# Patient Record
Sex: FEMALE | Race: BLACK OR AFRICAN AMERICAN | URBAN - METROPOLITAN AREA
[De-identification: names, ages, dates, MRNs, and addresses within clinical notes are randomized per-mention and may not be internally consistent; named-entity substitution may affect disease eponyms.]

---

## 2023-06-13 ENCOUNTER — APPOINTMENT (OUTPATIENT)
Dept: URBAN - METROPOLITAN AREA CLINIC 305 | Age: 58
Setting detail: DERMATOLOGY
End: 2023-06-13

## 2023-06-13 DIAGNOSIS — L68.0 HIRSUTISM: ICD-10-CM

## 2023-06-13 DIAGNOSIS — L81.0 POSTINFLAMMATORY HYPERPIGMENTATION: ICD-10-CM

## 2023-06-13 PROCEDURE — OTHER PRESCRIPTION: OTHER

## 2023-06-13 PROCEDURE — OTHER COUNSELING: OTHER

## 2023-06-13 PROCEDURE — 99203 OFFICE O/P NEW LOW 30 MIN: CPT

## 2023-06-13 PROCEDURE — OTHER MIPS QUALITY: OTHER

## 2023-06-13 PROCEDURE — OTHER PRESCRIPTION MEDICATION MANAGEMENT: OTHER

## 2023-06-13 RX ORDER — EFLORNITHINE HYDROCHLORIDE 139 MG/G
CREAM TOPICAL BID
Qty: 45 | Refills: 3 | Status: ERX | COMMUNITY
Start: 2023-06-13

## 2023-06-13 ASSESSMENT — LOCATION SIMPLE DESCRIPTION DERM
LOCATION SIMPLE: RIGHT LIP
LOCATION SIMPLE: LEFT CHEEK
LOCATION SIMPLE: CHIN

## 2023-06-13 ASSESSMENT — LOCATION DETAILED DESCRIPTION DERM
LOCATION DETAILED: LEFT CHIN
LOCATION DETAILED: LEFT INFERIOR MEDIAL BUCCAL CHEEK
LOCATION DETAILED: RIGHT LOWER CUTANEOUS LIP

## 2023-06-13 ASSESSMENT — LOCATION ZONE DERM
LOCATION ZONE: FACE
LOCATION ZONE: LIP

## 2023-06-13 NOTE — PROCEDURE: PRESCRIPTION MEDICATION MANAGEMENT
Detail Level: Zone
Plan: Patient has tried and failed Spironolactone, LHR, and vanicream. \\nPatient advised that inquiring with OB/ PCP to look into possible PCOS as main contributing factor. \\nPatient also advised patient to follow up with mental health therapist who prescribed lexapro and bupropion to discuss how antidepressants can stimulate hair growth.
Render In Strict Bullet Format?: No

## 2023-06-13 NOTE — HPI: UNWANTED HAIR
How Did Your Unwanted Hair Appear?: gradual in onset
How Severe Is Your Unwanted Hair?: mild
Additional History: Has done 10 sessions of laser hair removal 4-5 years ago.

## 2023-06-15 ENCOUNTER — APPOINTMENT (OUTPATIENT)
Dept: URBAN - METROPOLITAN AREA CLINIC 305 | Age: 58
Setting detail: DERMATOLOGY
End: 2023-06-15

## 2023-06-15 DIAGNOSIS — Z41.9 ENCOUNTER FOR PROCEDURE FOR PURPOSES OTHER THAN REMEDYING HEALTH STATE, UNSPECIFIED: ICD-10-CM

## 2023-06-15 PROCEDURE — OTHER LASER HAIR REMOVAL: OTHER

## 2023-06-15 ASSESSMENT — LOCATION ZONE DERM
LOCATION ZONE: LIP
LOCATION ZONE: FACE

## 2023-06-15 ASSESSMENT — LOCATION SIMPLE DESCRIPTION DERM
LOCATION SIMPLE: RIGHT CHEEK
LOCATION SIMPLE: LEFT LIP
LOCATION SIMPLE: CHIN
LOCATION SIMPLE: LEFT CHEEK

## 2023-06-15 ASSESSMENT — LOCATION DETAILED DESCRIPTION DERM
LOCATION DETAILED: RIGHT MENTAL CREASE
LOCATION DETAILED: RIGHT INFERIOR CENTRAL MALAR CHEEK
LOCATION DETAILED: LEFT INFERIOR CENTRAL MALAR CHEEK
LOCATION DETAILED: LEFT UPPER CUTANEOUS LIP

## 2023-06-15 NOTE — PROCEDURE: LASER HAIR REMOVAL
Treatment Number: 0
Fluence (Will Not Render If 0): 35
Treatment Number: 1
Anesthesia Type: 1% lidocaine with epinephrine
Pulse Duration (Include Units): 28 ms
Render Post-Care In The Note: No
Cooling: DCD 40/30
Fluence (Will Not Render If 0): 20
Number Of Prepaid Treatments (Will Not Render If 0): 7
Post-Care Instructions: I reviewed with the patient in detail post-care instructions. Patient should avoid sun for a minimum of 4 weeks before and after treatment.
Eye Shield Text: Given the treatment area eye shields were inserted prior to treatment.
Price (Use Numbers Only, No Special Characters Or $): 1500
Cooling: DCD setting
Spot Size: 12 mm
Pre-Procedure: Prior to proceeding the treatment areas were cleaned and all present put on their eye protection.
Post-Procedure Care: Immediate endpoint: perifollicular erythema and edema. Vaseline and ice applied. Post care reviewed with patient.
Shaving (Optional): The patient shaved at home
Spot Size: 9 mm
Fluence (Will Not Render If 0): 28
Laser Type: Alexandrite 755nm
Topical Anesthesia Type: 10% lidocaine
Consent: Written consent obtained, risks reviewed including but not limited to crusting, scabbing, blistering, scarring, darker or lighter pigmentary change, paradoxical hair regrowth, incomplete removal of hair and infection.
Detail Level: Detailed
Pulse Duration (Include Units): 3 ms

## 2023-06-23 ENCOUNTER — APPOINTMENT (OUTPATIENT)
Dept: URBAN - METROPOLITAN AREA CLINIC 305 | Age: 58
Setting detail: DERMATOLOGY
End: 2023-07-04

## 2023-06-23 NOTE — HPI: COSMETIC CONSULTATION
Additional History: Pt came in for cool sculpting consultation. Recommended 10 treatments to cover 100% of abdomen pt stated she just wanted to do 8 to get buy 4 get 4 explained to pt that going to 8 treatments would not give her 100% coverage and pt was okay with that.

## 2023-07-20 ENCOUNTER — APPOINTMENT (OUTPATIENT)
Dept: URBAN - METROPOLITAN AREA CLINIC 305 | Age: 58
Setting detail: DERMATOLOGY
End: 2023-07-24

## 2023-07-20 DIAGNOSIS — Z41.9 ENCOUNTER FOR PROCEDURE FOR PURPOSES OTHER THAN REMEDYING HEALTH STATE, UNSPECIFIED: ICD-10-CM

## 2023-07-20 PROCEDURE — OTHER COOLSCULPTING: OTHER

## 2023-07-20 NOTE — HPI: COSMETIC (COOLSCULPTING)
Have You Had Previous Coolsculpting Treatments Before?: has not had previous treatments
Additional History: Pt tolerated perfectly. Explained to pt she will feel bloated, swollen and may experience bruising and/or numbness

## 2023-07-20 NOTE — PROCEDURE: COOLSCULPTING
Price (Use Numbers Only, No Special Characters Or $): 0510 Price (Use Numbers Only, No Special Characters Or $): 6625

## 2023-07-27 ENCOUNTER — APPOINTMENT (OUTPATIENT)
Dept: URBAN - METROPOLITAN AREA CLINIC 305 | Age: 58
Setting detail: DERMATOLOGY
End: 2023-08-02

## 2023-07-27 DIAGNOSIS — Z41.9 ENCOUNTER FOR PROCEDURE FOR PURPOSES OTHER THAN REMEDYING HEALTH STATE, UNSPECIFIED: ICD-10-CM

## 2023-07-27 PROCEDURE — OTHER LASER HAIR REMOVAL: OTHER

## 2023-07-27 NOTE — HPI: COSMETIC (LASER HAIR REMOVAL)
Have You Had Laser Hair Removal Before?: has had previous treatment previous_has_had_previous_treatment

## 2023-07-27 NOTE — PROCEDURE: LASER HAIR REMOVAL
Pre-Procedure: Prior to proceeding the treatment areas were cleaned and all present put on their eye protection.
Anesthesia Type: 1% lidocaine with epinephrine
Treatment Number: 0
Cooling: DCD setting
Shaving (Optional): The patient shaved at home
Fluence (Will Not Render If 0): 20
Treatment Number: 2
Number Of Prepaid Treatments (Will Not Render If 0): 7
Eye Shield Text: Given the treatment area eye shields were inserted prior to treatment.
Pulse Duration (Include Units): 28 ms
Render Post-Care In The Note: No
Spot Size: 12 mm
Spot Size: 9 mm
Post-Procedure Care: Immediate endpoint: perifollicular erythema and edema. Vaseline and ice applied. Post care reviewed with patient.
Fluence (Will Not Render If 0): 40
Consent: Written consent obtained, risks reviewed including but not limited to crusting, scabbing, blistering, scarring, darker or lighter pigmentary change, paradoxical hair regrowth, incomplete removal of hair and infection.
Pulse Duration (Include Units): 35 ms
Laser Type: Nd:Yag 1064nm
Topical Anesthesia Type: 10% lidocaine
Detail Level: Detailed
Fluence (Will Not Render If 0): 22
Post-Care Instructions: I reviewed with the patient in detail post-care instructions. Patient should avoid sun for a minimum of 4 weeks before and after treatment.
Fluence (Will Not Render If 0): 30

## 2023-08-22 ENCOUNTER — APPOINTMENT (OUTPATIENT)
Dept: URBAN - METROPOLITAN AREA CLINIC 305 | Age: 58
Setting detail: DERMATOLOGY
End: 2023-08-23

## 2023-08-22 DIAGNOSIS — Z41.9 ENCOUNTER FOR PROCEDURE FOR PURPOSES OTHER THAN REMEDYING HEALTH STATE, UNSPECIFIED: ICD-10-CM

## 2023-08-22 PROCEDURE — OTHER COOLSCULPTING: OTHER

## 2023-08-22 NOTE — PROCEDURE: COOLSCULPTING
Price (Use Numbers Only, No Special Characters Or $): 8362 Price (Use Numbers Only, No Special Characters Or $): 3170

## 2023-08-22 NOTE — HPI: COSMETIC (COOLSCULPTING)
previous_has_had_previous_treatments Have You Had Previous Coolsculpting Treatments Before?: has had previous treatments

## 2023-09-07 ENCOUNTER — APPOINTMENT (OUTPATIENT)
Dept: URBAN - METROPOLITAN AREA CLINIC 305 | Age: 58
Setting detail: DERMATOLOGY
End: 2023-09-07

## 2023-09-07 DIAGNOSIS — Z41.9 ENCOUNTER FOR PROCEDURE FOR PURPOSES OTHER THAN REMEDYING HEALTH STATE, UNSPECIFIED: ICD-10-CM

## 2023-09-07 PROCEDURE — OTHER LASER HAIR REMOVAL: OTHER

## 2023-09-07 NOTE — PROCEDURE: LASER HAIR REMOVAL
Number Of Prepaid Treatments (Will Not Render If 0): 0
Were Eye Shields Employed?: No
Fluence (Will Not Render If 0): 35
Fluence (Will Not Render If 0): 20
Cooling: DCD 40/30
Detail Level: Detailed
Spot Size: 18 mm
Pulse Duration (Include Units): 35 ms
Topical Anesthesia Type: 10% lidocaine
Fluence (Will Not Render If 0): 28
Pre-Procedure: Prior to proceeding the treatment areas were cleaned and all present put on their eye protection.
Treatment Number: 3
Spot Size: 9 mm
Anesthesia Type: 1% lidocaine with epinephrine
Eye Shield Text: Given the treatment area eye shields were inserted prior to treatment.
Number Of Prepaid Treatments (Will Not Render If 0): 7
Consent: Written consent obtained, risks reviewed including but not limited to crusting, scabbing, blistering, scarring, darker or lighter pigmentary change, paradoxical hair regrowth, incomplete removal of hair and infection.
Pulse Duration (Include Units): 3 ms
Post-Procedure Care: Immediate endpoint: perifollicular erythema and edema. Vaseline and ice applied. Post care reviewed with patient.
Fluence (Will Not Render If 0): 18
Shaving (Optional): The patient shaved at home
Laser Type: Nd:Yag 1064nm
Cooling: DCD setting
Spot Size: 12 mm
Post-Care Instructions: I reviewed with the patient in detail post-care instructions. Patient should avoid sun for a minimum of 4 weeks before and after treatment.

## 2023-09-13 ENCOUNTER — APPOINTMENT (OUTPATIENT)
Dept: URBAN - METROPOLITAN AREA CLINIC 305 | Age: 58
Setting detail: DERMATOLOGY
End: 2023-09-13

## 2023-09-13 DIAGNOSIS — L68.0 HIRSUTISM: ICD-10-CM

## 2023-09-13 DIAGNOSIS — L90.5 SCAR CONDITIONS AND FIBROSIS OF SKIN: ICD-10-CM

## 2023-09-13 PROCEDURE — 99213 OFFICE O/P EST LOW 20 MIN: CPT

## 2023-09-13 PROCEDURE — OTHER TREATMENT REGIMEN: OTHER

## 2023-09-13 PROCEDURE — OTHER MIPS QUALITY: OTHER

## 2023-09-13 PROCEDURE — OTHER COUNSELING: OTHER

## 2023-09-13 PROCEDURE — OTHER PRESCRIPTION MEDICATION MANAGEMENT: OTHER

## 2023-09-13 NOTE — PROCEDURE: TREATMENT REGIMEN
Plan: Pt has been using silagen and has been pleased with the outcome. Pt advised to discuss potential microneedling with .
Detail Level: Zone

## 2023-09-13 NOTE — PROCEDURE: PRESCRIPTION MEDICATION MANAGEMENT
Detail Level: Zone
Plan: Patient advised that inquiring with OB/ PCP to look into possible PCOS as main contributing factor, will await those results. Patient is satisfied with LHR treatments.
Render In Strict Bullet Format?: No

## 2023-10-11 ENCOUNTER — APPOINTMENT (OUTPATIENT)
Dept: URBAN - METROPOLITAN AREA CLINIC 305 | Age: 58
Setting detail: DERMATOLOGY
End: 2023-10-26

## 2023-10-11 DIAGNOSIS — Z41.9 ENCOUNTER FOR PROCEDURE FOR PURPOSES OTHER THAN REMEDYING HEALTH STATE, UNSPECIFIED: ICD-10-CM

## 2023-10-11 PROCEDURE — OTHER COOLSCULPTING: OTHER

## 2023-10-11 NOTE — PROCEDURE: COOLSCULPTING
Time (Minutes - Will Only Render If Nonzero): 45
Time (Minutes - Will Only Render If Nonzero): 0
Post Treatment: After treatment, the suction was turned off, and the applicator was removed from the skin.
Suction Settings: The suction settings were per protocol.
Applicator Size: CoolAdvantage Plus
Applicator Size: standard applicator
Location 4: lower abdomen (left)
Device: Coolsculpting
Number Of Cycles: 1
Time (Minutes - Will Only Render If Nonzero): 35
Applicator Size: CoolAdvantage Core
Location 5: mid abdomen (left)
Consent: Written consent obtained, risks reviewed including, but not limited to, blistering from suction, darker or lighter pigmentary change, bruising, and/or need for multiple treatments.
Detail Level: Zone
Location 6: mid abdomen (right)
Location 1: lower abdomen (right)
Intro: Prior to treatment, the area was cleaned with alcohol and marked out with a marking pen. The gel sheet was then applied uniformly. The applicator was applied to the skin with good contact and suction.

## 2023-10-25 ENCOUNTER — APPOINTMENT (OUTPATIENT)
Dept: URBAN - METROPOLITAN AREA CLINIC 305 | Age: 58
Setting detail: DERMATOLOGY
End: 2023-10-26

## 2023-10-25 DIAGNOSIS — Z41.9 ENCOUNTER FOR PROCEDURE FOR PURPOSES OTHER THAN REMEDYING HEALTH STATE, UNSPECIFIED: ICD-10-CM

## 2023-10-25 PROCEDURE — OTHER MICRONEEDLING: OTHER

## 2023-10-25 PROCEDURE — OTHER LASER HAIR REMOVAL: OTHER

## 2023-10-25 ASSESSMENT — LOCATION DETAILED DESCRIPTION DERM: LOCATION DETAILED: RIGHT LOWER CUTANEOUS LIP

## 2023-10-25 ASSESSMENT — LOCATION SIMPLE DESCRIPTION DERM: LOCATION SIMPLE: RIGHT LIP

## 2023-10-25 ASSESSMENT — LOCATION ZONE DERM: LOCATION ZONE: LIP

## 2023-10-25 NOTE — PROCEDURE: MICRONEEDLING
Price (Use Numbers Only, No Special Characters Or $): 100
Depth In Mm: 0.1
Post-Care Instructions: After the procedure, take precautions agains sun exposure. Do not apply sunscreen for 12 hours after the procedure. Do not apply make-up for 12 hours after the procedure. Avoid alcohol based toners for 10-14 days. After 2-3 days patients can return to their regular skin regimen.
Treatment Number (Optional): 0
Detail Level: Zone
Topical Anesthesia?: 10% lidocaine
Consent: Written consent obtained, risks reviewed including but not limited to pain, scarring, infection and incomplete improvement.  Patient understands the procedure is cosmetic in nature and will require out of pocket payment.

## 2023-10-25 NOTE — PROCEDURE: LASER HAIR REMOVAL
Fluence (Will Not Render If 0): 20
Treatment Number: 0
Render Post-Care In The Note: No
Fluence (Will Not Render If 0): 35
Spot Size: 18 mm
Cooling: DCD 40/30
Detail Level: Detailed
Fluence (Will Not Render If 0): 16
Laser Type: Alexandrite 755nm
Pulse Duration (Include Units): 3 ms
Consent: Written consent obtained, risks reviewed including but not limited to crusting, scabbing, blistering, scarring, darker or lighter pigmentary change, paradoxical hair regrowth, incomplete removal of hair and infection.
Spot Size: 9 mm
Number Of Prepaid Treatments (Will Not Render If 0): 7
Post-Procedure Care: Immediate endpoint: perifollicular erythema and edema. Vaseline and ice applied. Post care reviewed with patient.
Topical Anesthesia Type: 10% lidocaine
Cooling: DCD setting
Post-Care Instructions: I reviewed with the patient in detail post-care instructions. Patient should avoid sun for a minimum of 4 weeks before and after treatment.
Pulse Duration (Include Units): 35 ms
Treatment Number: 4
Pre-Procedure: Prior to proceeding the treatment areas were cleaned and all present put on their eye protection.
Anesthesia Type: 1% lidocaine with epinephrine
Eye Shield Text: Given the treatment area eye shields were inserted prior to treatment.
Shaving (Optional): The patient shaved at home

## 2023-11-10 ENCOUNTER — APPOINTMENT (OUTPATIENT)
Dept: URBAN - METROPOLITAN AREA CLINIC 305 | Age: 58
Setting detail: DERMATOLOGY
End: 2024-02-20

## 2023-11-10 DIAGNOSIS — Z41.9 ENCOUNTER FOR PROCEDURE FOR PURPOSES OTHER THAN REMEDYING HEALTH STATE, UNSPECIFIED: ICD-10-CM

## 2023-11-10 PROCEDURE — OTHER COOLSCULPTING: OTHER

## 2023-11-10 NOTE — PROCEDURE: COOLSCULPTING
Suction Settings: The suction settings were per protocol.
Time (Minutes - Will Only Render If Nonzero): 0
Applicator Size: CoolAdvantage Core
Consent: Written consent obtained, risks reviewed including, but not limited to, blistering from suction, darker or lighter pigmentary change, bruising, and/or need for multiple treatments.
Applicator Size: standard applicator
Number Of Cycles: 1
Time (Minutes - Will Only Render If Nonzero): 35
Location 1: lower abdomen (right)
Location 2: lower abdomen (left)
Applicator Size: CoolAdvantage Plus
Device: Coolsculpting
Intro: Prior to treatment, the area was cleaned with alcohol and marked out with a marking pen. The gel sheet was then applied uniformly. The applicator was applied to the skin with good contact and suction.
Post Treatment: After treatment, the suction was turned off, and the applicator was removed from the skin.
Time (Minutes - Will Only Render If Nonzero): 45
Detail Level: Zone
10

## 2023-12-07 ENCOUNTER — APPOINTMENT (OUTPATIENT)
Dept: URBAN - METROPOLITAN AREA CLINIC 305 | Age: 58
Setting detail: DERMATOLOGY
End: 2023-12-07

## 2023-12-07 DIAGNOSIS — Z41.9 ENCOUNTER FOR PROCEDURE FOR PURPOSES OTHER THAN REMEDYING HEALTH STATE, UNSPECIFIED: ICD-10-CM

## 2023-12-07 PROCEDURE — OTHER MICRONEEDLING: OTHER

## 2023-12-07 PROCEDURE — OTHER COOLSCULPTING: OTHER

## 2023-12-07 PROCEDURE — OTHER LASER HAIR REMOVAL: OTHER

## 2023-12-07 NOTE — PROCEDURE: COOLSCULPTING
Suction Settings: The suction settings were per protocol.
Number Of Cycles: 1
Consent: Written consent obtained, risks reviewed including, but not limited to, blistering from suction, darker or lighter pigmentary change, bruising, and/or need for multiple treatments.
Time (Minutes - Will Only Render If Nonzero): 0
Applicator Size: standard applicator
Applicator Size: CoolAdvantage Plus
Location 1: lower abdomen (left)
Time (Minutes - Will Only Render If Nonzero): 45
Applicator Size: CoolAdvantage Core
Time (Minutes - Will Only Render If Nonzero): 35
Intro: Prior to treatment, the area was cleaned with alcohol and marked out with a marking pen. The gel sheet was then applied uniformly. The applicator was applied to the skin with good contact and suction.
Device: Coolsculpting
Location 2: lower abdomen (right)
Post Treatment: After treatment, the suction was turned off, and the applicator was removed from the skin.
Detail Level: Zone

## 2023-12-07 NOTE — PROCEDURE: MICRONEEDLING
Topical Anesthesia?: 10% lidocaine
Detail Level: Zone
Treatment Number (Optional): 2
Post-Care Instructions: After the procedure, take precautions agains sun exposure. Do not apply sunscreen for 12 hours after the procedure. Do not apply make-up for 12 hours after the procedure. Avoid alcohol based toners for 10-14 days. After 2-3 days patients can return to their regular skin regimen.
# Of Treatments In Package: 0
Consent: Written consent obtained, risks reviewed including but not limited to pain, scarring, infection and incomplete improvement.  Patient understands the procedure is cosmetic in nature and will require out of pocket payment.
Price (Use Numbers Only, No Special Characters Or $): 100

## 2023-12-07 NOTE — PROCEDURE: LASER HAIR REMOVAL
Fluence (Will Not Render If 0): 20
Post-Care Instructions: I reviewed with the patient in detail post-care instructions. Patient should avoid sun for a minimum of 4 weeks before and after treatment.
External Cooling Fan Speed: 0
Spot Size: 18 mm
Cooling: DCD setting
Render Post-Care In The Note: No
Cooling: DCD 40/30
Anesthesia Type: 1% lidocaine with epinephrine
Pre-Procedure: Prior to proceeding the treatment areas were cleaned and all present put on their eye protection.
Shaving (Optional): The patient shaved at home
Pulse Duration (Include Units): 3 ms
Spot Size: 9 mm
Fluence (Will Not Render If 0): 18
Topical Anesthesia Type: 10% lidocaine
Post-Procedure Care: Immediate endpoint: perifollicular erythema and edema. Vaseline and ice applied. Post care reviewed with patient.
Laser Type: Alexandrite 755nm
Fluence (Will Not Render If 0): 35
Detail Level: Detailed
Eye Shield Text: Given the treatment area eye shields were inserted prior to treatment.
Number Of Prepaid Treatments (Will Not Render If 0): 7
Pulse Duration (Include Units): 35 ms
Treatment Number: 5
Consent: Written consent obtained, risks reviewed including but not limited to crusting, scabbing, blistering, scarring, darker or lighter pigmentary change, paradoxical hair regrowth, incomplete removal of hair and infection.

## 2024-01-30 ENCOUNTER — APPOINTMENT (OUTPATIENT)
Dept: URBAN - METROPOLITAN AREA CLINIC 305 | Age: 59
Setting detail: DERMATOLOGY
End: 2024-01-30

## 2024-01-30 ENCOUNTER — APPOINTMENT (OUTPATIENT)
Dept: URBAN - METROPOLITAN AREA CLINIC 305 | Age: 59
Setting detail: DERMATOLOGY
End: 2024-02-01

## 2024-01-30 DIAGNOSIS — L30.8 OTHER SPECIFIED DERMATITIS: ICD-10-CM

## 2024-01-30 DIAGNOSIS — L68.0 HIRSUTISM: ICD-10-CM

## 2024-01-30 DIAGNOSIS — Z41.9 ENCOUNTER FOR PROCEDURE FOR PURPOSES OTHER THAN REMEDYING HEALTH STATE, UNSPECIFIED: ICD-10-CM

## 2024-01-30 PROCEDURE — OTHER MICRONEEDLING: OTHER

## 2024-01-30 PROCEDURE — OTHER TREATMENT REGIMEN: OTHER

## 2024-01-30 PROCEDURE — OTHER MIPS QUALITY: OTHER

## 2024-01-30 PROCEDURE — OTHER COOLSCULPTING: OTHER

## 2024-01-30 PROCEDURE — OTHER PRESCRIPTION MEDICATION MANAGEMENT: OTHER

## 2024-01-30 PROCEDURE — 99213 OFFICE O/P EST LOW 20 MIN: CPT

## 2024-01-30 PROCEDURE — OTHER LASER HAIR REMOVAL: OTHER

## 2024-01-30 PROCEDURE — OTHER COUNSELING: OTHER

## 2024-01-30 PROCEDURE — OTHER COUNSELING: TOPICAL STEROIDS: OTHER

## 2024-01-30 PROCEDURE — OTHER PRESCRIPTION: OTHER

## 2024-01-30 RX ORDER — CLOBETASOL PROPIONATE 0.5 MG/G
CREAM TOPICAL
Qty: 60 | Refills: 2 | Status: ERX | COMMUNITY
Start: 2024-01-30

## 2024-01-30 ASSESSMENT — LOCATION DETAILED DESCRIPTION DERM: LOCATION DETAILED: LEFT DISTAL DORSAL RING FINGER

## 2024-01-30 ASSESSMENT — BSA RASH: BSA RASH: 2

## 2024-01-30 ASSESSMENT — LOCATION ZONE DERM: LOCATION ZONE: FINGER

## 2024-01-30 ASSESSMENT — SEVERITY ASSESSMENT: SEVERITY: MILD

## 2024-01-30 ASSESSMENT — ITCH NUMERIC RATING SCALE: HOW SEVERE IS YOUR ITCHING?: 3

## 2024-01-30 ASSESSMENT — LOCATION SIMPLE DESCRIPTION DERM: LOCATION SIMPLE: LEFT RING FINGER

## 2024-01-30 NOTE — HPI: RASH
What Type Of Note Output Would You Prefer (Optional)?: Bullet Format
Is The Patient Presenting As Previously Scheduled?: Yes
How Severe Is Your Rash?: mild
Is This A New Presentation, Or A Follow-Up?: Rash
Additional History: Acetone irritates the finger.

## 2024-01-30 NOTE — PROCEDURE: LASER HAIR REMOVAL
Post-Care Instructions: I reviewed with the patient in detail post-care instructions. Patient should avoid sun for a minimum of 4 weeks before and after treatment.
Treatment Number: 6
Shaving (Optional): The patient shaved at home
Number Of Prepaid Treatments (Will Not Render If 0): 7
Treatment Number: 0
Fluence (Will Not Render If 0): 20
Render Post-Care In The Note: No
Fluence (Will Not Render If 0): 35
Topical Anesthesia Type: 10% lidocaine
Cooling: DCD 40/30
Spot Size: 18 mm
Pulse Duration (Include Units): 35 ms
Detail Level: Detailed
Anesthesia Type: 1% lidocaine with epinephrine
Cooling: DCD setting
Pre-Procedure: Prior to proceeding the treatment areas were cleaned and all present put on their eye protection.
Spot Size: 22 mm
Eye Shield Text: Given the treatment area eye shields were inserted prior to treatment.
Post-Procedure Care: Immediate endpoint: perifollicular erythema and edema. Vaseline and ice applied. Post care reviewed with patient.
Laser Type: Alexandrite 755nm
Fluence (Will Not Render If 0): 10
Consent: Written consent obtained, risks reviewed including but not limited to crusting, scabbing, blistering, scarring, darker or lighter pigmentary change, paradoxical hair regrowth, incomplete removal of hair and infection.
Spot Size: 9 mm
Pulse Duration (Include Units): 3 ms
Other

## 2024-01-30 NOTE — PROCEDURE: COOLSCULPTING
Applicator Size: standard applicator
Number Of Cycles: 1
Consent: Written consent obtained, risks reviewed including, but not limited to, blistering from suction, darker or lighter pigmentary change, bruising, and/or need for multiple treatments.
Location 1: lower abdomen (left)
Time (Minutes - Will Only Render If Nonzero): 0
Suction Settings: The suction settings were per protocol.
Applicator Size: CoolAdvantage Core
Time (Minutes - Will Only Render If Nonzero): 35
Device: Coolsculpting
Location 2: lower abdomen (right)
Applicator Size: CoolAdvantage Plus
Intro: Prior to treatment, the area was cleaned with alcohol and marked out with a marking pen. The gel sheet was then applied uniformly. The applicator was applied to the skin with good contact and suction.
Detail Level: Zone
Post Treatment: After treatment, the suction was turned off, and the applicator was removed from the skin.
Time (Minutes - Will Only Render If Nonzero): 45

## 2024-01-30 NOTE — PROCEDURE: MICRONEEDLING
# Of Treatments In Package: 0
Price (Use Numbers Only, No Special Characters Or $): 100
Post-Care Instructions: After the procedure, take precautions agains sun exposure. Do not apply sunscreen for 12 hours after the procedure. Do not apply make-up for 12 hours after the procedure. Avoid alcohol based toners for 10-14 days. After 2-3 days patients can return to their regular skin regimen.
Topical Anesthesia?: 10% lidocaine
Treatment Number (Optional): 3
Detail Level: Zone
Consent: Written consent obtained, risks reviewed including but not limited to pain, scarring, infection and incomplete improvement.  Patient understands the procedure is cosmetic in nature and will require out of pocket payment.

## 2024-01-30 NOTE — PROCEDURE: TREATMENT REGIMEN
Plan: Reassured not contagious. Likely from acrylic nails. Start clobetasol qhs. Apply gloves after nightly application.
Detail Level: Zone

## 2024-01-30 NOTE — PROCEDURE: PRESCRIPTION MEDICATION MANAGEMENT
Detail Level: Zone
Plan: Patient is satisfied with LHR treatments in aesthetics and spironolactone managed by pcp.
Render In Strict Bullet Format?: No

## 2024-03-12 ENCOUNTER — APPOINTMENT (OUTPATIENT)
Dept: URBAN - METROPOLITAN AREA CLINIC 305 | Age: 59
Setting detail: DERMATOLOGY
End: 2024-03-14

## 2024-03-12 DIAGNOSIS — Z41.9 ENCOUNTER FOR PROCEDURE FOR PURPOSES OTHER THAN REMEDYING HEALTH STATE, UNSPECIFIED: ICD-10-CM

## 2024-03-12 PROCEDURE — OTHER LASER HAIR REMOVAL: OTHER

## 2024-03-12 NOTE — PROCEDURE: LASER HAIR REMOVAL
Post-Procedure Care: Immediate endpoint: perifollicular erythema and edema. Vaseline and ice applied. Post care reviewed with patient.
Were Eye Shields Employed?: No
Number Of Prepaid Treatments (Will Not Render If 0): 7
Cooling: DCD 40/30
Fluence (Will Not Render If 0): 16
Pulse Duration (Include Units): 35 ms
Detail Level: Detailed
Spot Size: 12 mm
Treatment Number: 0
Consent: Written consent obtained, risks reviewed including but not limited to crusting, scabbing, blistering, scarring, darker or lighter pigmentary change, paradoxical hair regrowth, incomplete removal of hair and infection.
Anesthesia Type: 1% lidocaine with epinephrine
Pre-Procedure: Prior to proceeding the treatment areas were cleaned and all present put on their eye protection.
Fluence (Will Not Render If 0): 35
Topical Anesthesia Type: 10% lidocaine
Shaving (Optional): The patient shaved at home
Spot Size: 18 mm
Eye Shield Text: Given the treatment area eye shields were inserted prior to treatment.
Fluence (Will Not Render If 0): 26
Laser Type: Nd:Yag 1064nm
Fluence (Will Not Render If 0): 22
Pulse Duration (Include Units): 3 ms
Spot Size: 9 mm
Post-Care Instructions: I reviewed with the patient in detail post-care instructions. Patient should avoid sun for a minimum of 4 weeks before and after treatment.

## 2024-04-24 ENCOUNTER — APPOINTMENT (OUTPATIENT)
Dept: URBAN - METROPOLITAN AREA CLINIC 305 | Age: 59
Setting detail: DERMATOLOGY
End: 2024-04-25

## 2024-04-24 DIAGNOSIS — Z41.9 ENCOUNTER FOR PROCEDURE FOR PURPOSES OTHER THAN REMEDYING HEALTH STATE, UNSPECIFIED: ICD-10-CM

## 2024-04-24 PROCEDURE — OTHER LASER HAIR REMOVAL: OTHER

## 2024-04-24 NOTE — PROCEDURE: LASER HAIR REMOVAL
Fluence (Will Not Render If 0): 35
Number Of Prepaid Treatments (Will Not Render If 0): 0
Were Eye Shields Employed?: No
Spot Size: 12 mm
Cooling: DCD 40/30
Price (Use Numbers Only, No Special Characters Or $): 50
Laser Type: Alexandrite 755nm
Pulse Duration (Include Units): 35 ms
Consent: Written consent obtained, risks reviewed including but not limited to crusting, scabbing, blistering, scarring, darker or lighter pigmentary change, paradoxical hair regrowth, incomplete removal of hair and infection.
Fluence (Will Not Render If 0): 18
Spot Size: 9 mm
Anesthesia Type: 1% lidocaine with epinephrine
Eye Shield Text: Given the treatment area eye shields were inserted prior to treatment.
Treatment Number: 8
Pulse Duration (Include Units): 3 ms
Shaving (Optional): The patient shaved at home
Pre-Procedure: Prior to proceeding the treatment areas were cleaned and all present put on their eye protection.
Post-Care Instructions: I reviewed with the patient in detail post-care instructions. Patient should avoid sun for a minimum of 4 weeks before and after treatment.
Fluence (Will Not Render If 0): 22
Spot Size: 18 mm
Post-Procedure Care: Immediate endpoint: perifollicular erythema and edema. Vaseline and ice applied. Post care reviewed with patient.
Fluence (Will Not Render If 0): 26
Cooling: DCD setting
Topical Anesthesia Type: 10% lidocaine
Detail Level: Detailed

## 2024-06-28 ENCOUNTER — APPOINTMENT (OUTPATIENT)
Dept: URBAN - METROPOLITAN AREA CLINIC 305 | Age: 59
Setting detail: DERMATOLOGY
End: 2024-06-28

## 2024-06-28 DIAGNOSIS — Z41.9 ENCOUNTER FOR PROCEDURE FOR PURPOSES OTHER THAN REMEDYING HEALTH STATE, UNSPECIFIED: ICD-10-CM

## 2024-06-28 PROCEDURE — OTHER LASER HAIR REMOVAL: OTHER

## 2024-06-28 ASSESSMENT — LOCATION ZONE DERM
LOCATION ZONE: NECK
LOCATION ZONE: FACE
LOCATION ZONE: LIP

## 2024-06-28 ASSESSMENT — LOCATION DETAILED DESCRIPTION DERM
LOCATION DETAILED: LEFT SUPERIOR LATERAL NECK
LOCATION DETAILED: LEFT CHIN
LOCATION DETAILED: PHILTRUM
LOCATION DETAILED: RIGHT SUPERIOR MEDIAL BUCCAL CHEEK
LOCATION DETAILED: RIGHT SUPERIOR LATERAL NECK
LOCATION DETAILED: LEFT SUPERIOR LATERAL BUCCAL CHEEK

## 2024-06-28 ASSESSMENT — LOCATION SIMPLE DESCRIPTION DERM
LOCATION SIMPLE: CHIN
LOCATION SIMPLE: RIGHT CHEEK
LOCATION SIMPLE: LEFT CHEEK
LOCATION SIMPLE: UPPER LIP
LOCATION SIMPLE: RIGHT ANTERIOR NECK
LOCATION SIMPLE: LEFT ANTERIOR NECK

## 2024-06-28 NOTE — PROCEDURE: LASER HAIR REMOVAL
Fluence (Will Not Render If 0): 35
Cooling: DCD 40/30
Spot Size: 20 mm
Pulse Duration (Include Units): 25  ms
Treatment Number: 0
Consent: Written consent obtained, risks reviewed including but not limited to crusting, scabbing, blistering, scarring, darker or lighter pigmentary change, paradoxical hair regrowth, incomplete removal of hair and infection.
Eye Shield Text: Given the treatment area eye shields were inserted prior to treatment.
Price (Use Numbers Only, No Special Characters Or $): 50
Spot Size: 9 mm
Cooling: DCD setting
Fluence (Will Not Render If 0): 14
Pre-Procedure: Prior to proceeding the treatment areas were cleaned and all present put on their eye protection.
Shaving (Optional): The patient shaved at home
Location Override: chin/lip touch up
Spot Size: 18 mm
Post-Procedure Care: Immediate endpoint: perifollicular erythema and edema. Vaseline and ice applied. Post care reviewed with patient.
Topical Anesthesia Type: 10% lidocaine
Fluence (Will Not Render If 0): 10
Laser Type: Alexandrite 755nm
Render Post-Care In The Note: No
Pulse Duration (Include Units): 25 ms
Detail Level: Detailed
Laser Type: Nd:Yag 1064nm
Fluence (Will Not Render If 0): 40
Post-Care Instructions: I reviewed with the patient in detail post-care instructions. Patient should avoid sun for a minimum of 4 weeks before and after treatment.

## 2024-09-05 ENCOUNTER — APPOINTMENT (OUTPATIENT)
Dept: URBAN - METROPOLITAN AREA CLINIC 305 | Age: 59
Setting detail: DERMATOLOGY
End: 2024-09-05

## 2024-09-05 DIAGNOSIS — Z41.9 ENCOUNTER FOR PROCEDURE FOR PURPOSES OTHER THAN REMEDYING HEALTH STATE, UNSPECIFIED: ICD-10-CM

## 2024-09-05 PROCEDURE — OTHER LASER HAIR REMOVAL: OTHER

## 2024-09-05 NOTE — PROCEDURE: LASER HAIR REMOVAL
Eye Shield Text: Given the treatment area eye shields were inserted prior to treatment.
Spot Size: 9 mm
External Cooling Fan Speed: 0
Fluence (Will Not Render If 0): 8
Shaving (Optional): The patient shaved at home
Post-Procedure Care: Immediate endpoint: perifollicular erythema and edema. Vaseline and ice applied. Post care reviewed with patient.
Cooling: DCD setting
Price (Use Numbers Only, No Special Characters Or $): 50
Spot Size: 12 mm
Laser Type: Alexandrite 755nm
Cooling: DCD 40/30
Spot Size: 18 mm
Fluence (Will Not Render If 0): 40
Consent: Written consent obtained, risks reviewed including but not limited to crusting, scabbing, blistering, scarring, darker or lighter pigmentary change, paradoxical hair regrowth, incomplete removal of hair and infection.
Pulse Duration (Include Units): 28 ms
Post-Care Instructions: I reviewed with the patient in detail post-care instructions. Patient should avoid sun for a minimum of 4 weeks before and after treatment.
Detail Level: Detailed
Fluence (Will Not Render If 0): 28
Were Eye Shields Employed?: No
Pre-Procedure: Prior to proceeding the treatment areas were cleaned and all present put on their eye protection.
Pulse Duration (Include Units): 3 ms
Fluence (Will Not Render If 0): 16

## 2024-11-19 ENCOUNTER — APPOINTMENT (OUTPATIENT)
Dept: URBAN - METROPOLITAN AREA CLINIC 305 | Age: 59
Setting detail: DERMATOLOGY
End: 2024-11-21

## 2024-11-19 DIAGNOSIS — Z41.9 ENCOUNTER FOR PROCEDURE FOR PURPOSES OTHER THAN REMEDYING HEALTH STATE, UNSPECIFIED: ICD-10-CM

## 2024-11-19 PROCEDURE — OTHER LASER HAIR REMOVAL: OTHER

## 2025-01-28 ENCOUNTER — APPOINTMENT (OUTPATIENT)
Dept: URBAN - METROPOLITAN AREA CLINIC 305 | Age: 60
Setting detail: DERMATOLOGY
End: 2025-01-28

## 2025-01-28 DIAGNOSIS — Z41.9 ENCOUNTER FOR PROCEDURE FOR PURPOSES OTHER THAN REMEDYING HEALTH STATE, UNSPECIFIED: ICD-10-CM

## 2025-01-28 PROCEDURE — OTHER LASER HAIR REMOVAL: OTHER

## 2025-01-28 NOTE — PROCEDURE: LASER HAIR REMOVAL
Treatment Number: 0
Price (Use Numbers Only, No Special Characters Or $): 50
Fluence (Will Not Render If 0): 14
Cooling: DCD 40/30
Spot Size: 18 mm
Pre-Procedure: Prior to proceeding the treatment areas were cleaned and all present put on their eye protection.
Pulse Duration (Include Units): 3ms
Eye Shield Text: Given the treatment area eye shields were inserted prior to treatment.
Post-Procedure Care: Immediate endpoint: perifollicular erythema and edema. Vaseline and ice applied. Post care reviewed with patient.
Fluence (Will Not Render If 0): 10
Laser Type: Nd:Yag 1064nm
Were Eye Shields Employed?: No
Detail Level: Detailed
Cooling: DCD setting
Spot Size: 9 mm
Pulse Duration (Include Units): 3 ms
Consent: Written consent obtained, risks reviewed including but not limited to crusting, scabbing, blistering, scarring, darker or lighter pigmentary change, paradoxical hair regrowth, incomplete removal of hair and infection.
Post-Care Instructions: I reviewed with the patient in detail post-care instructions. Patient should avoid sun for a minimum of 4 weeks before and after treatment.
Fluence (Will Not Render If 0): 40
Fluence (Will Not Render If 0): 8
Shaving (Optional): The patient shaved at home
Pulse Duration (Include Units): 28 ms

## 2025-04-10 ENCOUNTER — APPOINTMENT (OUTPATIENT)
Dept: URBAN - METROPOLITAN AREA CLINIC 305 | Age: 60
Setting detail: DERMATOLOGY
End: 2025-04-10

## 2025-04-10 DIAGNOSIS — Z41.9 ENCOUNTER FOR PROCEDURE FOR PURPOSES OTHER THAN REMEDYING HEALTH STATE, UNSPECIFIED: ICD-10-CM

## 2025-04-10 PROCEDURE — OTHER LASER HAIR REMOVAL: OTHER

## 2025-04-10 NOTE — PROCEDURE: LASER HAIR REMOVAL
Cooling: DCD setting
Pre-Procedure: Prior to proceeding the treatment areas were cleaned and all present put on their eye protection.
Treatment Number: 0
Fluence (Will Not Render If 0): 20
Spot Size: 12 mm
Shaving (Optional): The patient shaved at home
Cooling: DCD 40/30
Pulse Duration (Include Units): 3 ms
Post-Procedure Care: Immediate endpoint: perifollicular erythema and edema. Vaseline and ice applied. Post care reviewed with patient.
Fluence (Will Not Render If 0): 26
Laser Type: Nd:Yag 1064nm
Spot Size: 9 mm
Were Eye Shields Employed?: No
Spot Size: 18 mm
Consent: Written consent obtained, risks reviewed including but not limited to crusting, scabbing, blistering, scarring, darker or lighter pigmentary change, paradoxical hair regrowth, incomplete removal of hair and infection.
Detail Level: Detailed
Fluence (Will Not Render If 0): 40
Eye Shield Text: Given the treatment area eye shields were inserted prior to treatment.
Post-Care Instructions: I reviewed with the patient in detail post-care instructions. Patient should avoid sun for a minimum of 4 weeks before and after treatment.
Fluence (Will Not Render If 0): 10
Pulse Duration (Include Units): 28 ms.
Price (Use Numbers Only, No Special Characters Or $): 50